# Patient Record
Sex: MALE | Race: WHITE | NOT HISPANIC OR LATINO | Employment: FULL TIME | URBAN - METROPOLITAN AREA
[De-identification: names, ages, dates, MRNs, and addresses within clinical notes are randomized per-mention and may not be internally consistent; named-entity substitution may affect disease eponyms.]

---
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
## 2019-12-21 ENCOUNTER — SURGERY - HEALTHEAST (OUTPATIENT)
Dept: GASTROENTEROLOGY | Facility: CLINIC | Age: 19
End: 2019-12-21

## 2019-12-21 ENCOUNTER — APPOINTMENT (OUTPATIENT)
Dept: CT IMAGING | Facility: CLINIC | Age: 19
End: 2019-12-21
Attending: EMERGENCY MEDICINE
Payer: COMMERCIAL

## 2019-12-21 ENCOUNTER — HOSPITAL ENCOUNTER (EMERGENCY)
Facility: CLINIC | Age: 19
Discharge: ANOTHER HOSPITAL | End: 2019-12-21
Attending: EMERGENCY MEDICINE | Admitting: EMERGENCY MEDICINE
Payer: COMMERCIAL

## 2019-12-21 VITALS
DIASTOLIC BLOOD PRESSURE: 67 MMHG | WEIGHT: 100 LBS | HEART RATE: 93 BPM | OXYGEN SATURATION: 100 % | BODY MASS INDEX: 16.9 KG/M2 | TEMPERATURE: 98.1 F | SYSTOLIC BLOOD PRESSURE: 114 MMHG | RESPIRATION RATE: 18 BRPM

## 2019-12-21 DIAGNOSIS — K56.2 VOLVULUS OF SIGMOID COLON (H): ICD-10-CM

## 2019-12-21 DIAGNOSIS — K56.609 COLONIC OBSTRUCTION (H): ICD-10-CM

## 2019-12-21 LAB
ALBUMIN SERPL-MCNC: 3.9 G/DL (ref 3.4–5)
ALBUMIN UR-MCNC: NEGATIVE MG/DL
ALP SERPL-CCNC: 88 U/L (ref 40–150)
ALT SERPL W P-5'-P-CCNC: 17 U/L (ref 0–70)
ANION GAP SERPL CALCULATED.3IONS-SCNC: 7 MMOL/L (ref 3–14)
APPEARANCE UR: CLEAR
AST SERPL W P-5'-P-CCNC: 12 U/L (ref 0–45)
BASOPHILS # BLD AUTO: 0 10E9/L (ref 0–0.2)
BASOPHILS NFR BLD AUTO: 0.3 %
BILIRUB SERPL-MCNC: 0.3 MG/DL (ref 0.2–1.3)
BILIRUB UR QL STRIP: NEGATIVE
BUN SERPL-MCNC: 17 MG/DL (ref 7–30)
CALCIUM SERPL-MCNC: 8.8 MG/DL (ref 8.5–10.1)
CHLORIDE SERPL-SCNC: 106 MMOL/L (ref 94–109)
CO2 SERPL-SCNC: 28 MMOL/L (ref 20–32)
COLOR UR AUTO: YELLOW
CREAT SERPL-MCNC: 0.93 MG/DL (ref 0.66–1.25)
DIFFERENTIAL METHOD BLD: ABNORMAL
EOSINOPHIL # BLD AUTO: 0.3 10E9/L (ref 0–0.7)
EOSINOPHIL NFR BLD AUTO: 2 %
ERYTHROCYTE [DISTWIDTH] IN BLOOD BY AUTOMATED COUNT: 12.7 % (ref 10–15)
GFR SERPL CREATININE-BSD FRML MDRD: >90 ML/MIN/{1.73_M2}
GLUCOSE SERPL-MCNC: 173 MG/DL (ref 70–99)
GLUCOSE UR STRIP-MCNC: NEGATIVE MG/DL
HCT VFR BLD AUTO: 42.2 % (ref 40–53)
HGB BLD-MCNC: 14.1 G/DL (ref 13.3–17.7)
HGB UR QL STRIP: NEGATIVE
IMM GRANULOCYTES # BLD: 0.1 10E9/L (ref 0–0.4)
IMM GRANULOCYTES NFR BLD: 0.5 %
KETONES UR STRIP-MCNC: 5 MG/DL
LACTATE BLD-SCNC: 2.9 MMOL/L (ref 0.7–2)
LEUKOCYTE ESTERASE UR QL STRIP: NEGATIVE
LYMPHOCYTES # BLD AUTO: 1.9 10E9/L (ref 0.8–5.3)
LYMPHOCYTES NFR BLD AUTO: 13.1 %
MCH RBC QN AUTO: 28.6 PG (ref 26.5–33)
MCHC RBC AUTO-ENTMCNC: 33.4 G/DL (ref 31.5–36.5)
MCV RBC AUTO: 86 FL (ref 78–100)
MONOCYTES # BLD AUTO: 0.6 10E9/L (ref 0–1.3)
MONOCYTES NFR BLD AUTO: 4.2 %
NEUTROPHILS # BLD AUTO: 11.9 10E9/L (ref 1.6–8.3)
NEUTROPHILS NFR BLD AUTO: 79.9 %
NITRATE UR QL: NEGATIVE
NRBC # BLD AUTO: 0 10*3/UL
NRBC BLD AUTO-RTO: 0 /100
PH UR STRIP: 5 PH (ref 5–7)
PLATELET # BLD AUTO: 282 10E9/L (ref 150–450)
POTASSIUM SERPL-SCNC: 3.5 MMOL/L (ref 3.4–5.3)
PROT SERPL-MCNC: 6.9 G/DL (ref 6.8–8.8)
RBC # BLD AUTO: 4.93 10E12/L (ref 4.4–5.9)
SODIUM SERPL-SCNC: 141 MMOL/L (ref 133–144)
SOURCE: ABNORMAL
SP GR UR STRIP: 1.03 (ref 1–1.03)
UROBILINOGEN UR STRIP-MCNC: 2 MG/DL (ref 0–2)
WBC # BLD AUTO: 14.8 10E9/L (ref 4–11)

## 2019-12-21 PROCEDURE — 25000128 H RX IP 250 OP 636: Performed by: EMERGENCY MEDICINE

## 2019-12-21 PROCEDURE — 83605 ASSAY OF LACTIC ACID: CPT | Performed by: EMERGENCY MEDICINE

## 2019-12-21 PROCEDURE — 96375 TX/PRO/DX INJ NEW DRUG ADDON: CPT | Performed by: EMERGENCY MEDICINE

## 2019-12-21 PROCEDURE — 85025 COMPLETE CBC W/AUTO DIFF WBC: CPT | Performed by: EMERGENCY MEDICINE

## 2019-12-21 PROCEDURE — 25800030 ZZH RX IP 258 OP 636: Performed by: EMERGENCY MEDICINE

## 2019-12-21 PROCEDURE — 96365 THER/PROPH/DIAG IV INF INIT: CPT | Mod: 59 | Performed by: EMERGENCY MEDICINE

## 2019-12-21 PROCEDURE — 25000125 ZZHC RX 250: Performed by: EMERGENCY MEDICINE

## 2019-12-21 PROCEDURE — 99285 EMERGENCY DEPT VISIT HI MDM: CPT | Mod: 25 | Performed by: EMERGENCY MEDICINE

## 2019-12-21 PROCEDURE — 74177 CT ABD & PELVIS W/CONTRAST: CPT

## 2019-12-21 PROCEDURE — 99285 EMERGENCY DEPT VISIT HI MDM: CPT | Mod: Z6 | Performed by: EMERGENCY MEDICINE

## 2019-12-21 PROCEDURE — 81003 URINALYSIS AUTO W/O SCOPE: CPT | Performed by: EMERGENCY MEDICINE

## 2019-12-21 PROCEDURE — 80053 COMPREHEN METABOLIC PANEL: CPT | Performed by: EMERGENCY MEDICINE

## 2019-12-21 PROCEDURE — 96361 HYDRATE IV INFUSION ADD-ON: CPT | Performed by: EMERGENCY MEDICINE

## 2019-12-21 RX ORDER — HYDROMORPHONE HYDROCHLORIDE 1 MG/ML
0.5 INJECTION, SOLUTION INTRAMUSCULAR; INTRAVENOUS; SUBCUTANEOUS
Status: DISCONTINUED | OUTPATIENT
Start: 2019-12-21 | End: 2019-12-21 | Stop reason: HOSPADM

## 2019-12-21 RX ORDER — ONDANSETRON 2 MG/ML
4 INJECTION INTRAMUSCULAR; INTRAVENOUS ONCE
Status: COMPLETED | OUTPATIENT
Start: 2019-12-21 | End: 2019-12-21

## 2019-12-21 RX ORDER — IOPAMIDOL 755 MG/ML
52 INJECTION, SOLUTION INTRAVASCULAR ONCE
Status: COMPLETED | OUTPATIENT
Start: 2019-12-21 | End: 2019-12-21

## 2019-12-21 RX ORDER — ONDANSETRON 2 MG/ML
4 INJECTION INTRAMUSCULAR; INTRAVENOUS EVERY 30 MIN PRN
Status: DISCONTINUED | OUTPATIENT
Start: 2019-12-21 | End: 2019-12-21 | Stop reason: HOSPADM

## 2019-12-21 RX ADMIN — SODIUM CHLORIDE 53 ML: 9 INJECTION, SOLUTION INTRAVENOUS at 05:33

## 2019-12-21 RX ADMIN — ONDANSETRON 4 MG: 2 INJECTION INTRAMUSCULAR; INTRAVENOUS at 04:48

## 2019-12-21 RX ADMIN — SODIUM CHLORIDE, POTASSIUM CHLORIDE, SODIUM LACTATE AND CALCIUM CHLORIDE 1000 ML: 600; 310; 30; 20 INJECTION, SOLUTION INTRAVENOUS at 06:57

## 2019-12-21 RX ADMIN — HYDROMORPHONE HYDROCHLORIDE 0.5 MG: 1 INJECTION, SOLUTION INTRAMUSCULAR; INTRAVENOUS; SUBCUTANEOUS at 05:27

## 2019-12-21 RX ADMIN — IOPAMIDOL 52 ML: 755 INJECTION, SOLUTION INTRAVENOUS at 05:33

## 2019-12-21 RX ADMIN — TAZOBACTAM SODIUM AND PIPERACILLIN SODIUM 4.5 G: 500; 4 INJECTION, SOLUTION INTRAVENOUS at 06:20

## 2019-12-21 ASSESSMENT — MIFFLIN-ST. JEOR
SCORE: 1376.51
SCORE: 1376.51

## 2019-12-21 NOTE — ED NOTES
DATE:  12/21/2019   TIME OF RECEIPT FROM LAB:  0555  LAB TEST:  Lactic   LAB VALUE:  2.9  RESULTS GIVEN WITH READ-BACK TO (PROVIDER):  Joe Rowe MD  TIME LAB VALUE REPORTED TO PROVIDER:   0557

## 2019-12-21 NOTE — ED PROVIDER NOTES
"  History     Chief Complaint   Patient presents with     Abdominal Pain     LLQ- \"painful bladder\"- hx UTI due to \"improper hygeine\"     HPI  Adonay Serrano is a 19 year old male with history fecal incontinence and urinary tract infections who presents with acute onset LLQ pain. Constant, \"just jurts\" non radiating, no provocative or palliating features.  Has not had similar pain before.  Not taking anything for pain.  Reports nausea and vomiting.  Denies fever, chills, cough, sore throat, headache, chest pain, shortness of breath.  No history abdominal surgeries.  Not currently taking any medications    The patient's PMHx, Surgical Hx, Allergies, and Medications were all reviewed with the patient.    Allergies:  No Known Allergies    Problem List:    Patient Active Problem List    Diagnosis Date Noted     Incontinence of feces with fecal urgency 11/02/2017     Priority: Medium        Past Medical History:    No past medical history on file.    Past Surgical History:    No past surgical history on file.    Family History:    Family History   Problem Relation Age of Onset     Diabetes Maternal Grandmother      Hypertension Maternal Grandmother      Genitourinary Problems Maternal Grandmother         polycystic kidney     Diabetes Maternal Grandfather      C.A.D. Maternal Grandfather      Cardiovascular Paternal Grandmother      Cardiovascular Paternal Grandfather        Social History:  Marital Status:  Single [1]  Social History     Tobacco Use     Smoking status: Never Smoker     Smokeless tobacco: Never Used   Substance Use Topics     Alcohol use: No     Drug use: No        Medications:    Acetaminophen (TYLENOL PO)  sulfamethoxazole-trimethoprim (BACTRIM DS/SEPTRA DS) 800-160 MG per tablet          Review of Systems   Constitutional: Negative for chills and fever.   HENT: Negative for congestion and trouble swallowing.    Eyes: Negative for visual disturbance.   Respiratory: Negative for cough, chest tightness and " shortness of breath.    Cardiovascular: Negative for chest pain and leg swelling.   Gastrointestinal: Positive for abdominal pain, nausea and vomiting. Negative for blood in stool and diarrhea.   Genitourinary: Negative for difficulty urinating and dysuria.   Musculoskeletal: Negative for back pain and neck pain.   Skin: Negative for rash.   Neurological: Negative for dizziness.       Physical Exam   BP: 126/78  Pulse: 103  Heart Rate: 106  Temp: 98.1  F (36.7  C)  Resp: 18  SpO2: 100 %    Physical Exam  GEN: Awake, alert, and cooperative.  Appears acutely distressed, lying on side with knees tucked to chest.  HENT: MMM. External ears and nose normal bilaterally.  EYES: EOM intact. Conjunctiva clear. No discharge.   NECK: Supple, symmetric.  CV : Tachycardic rate, regular rhythm..  Extremities warm and well perfused.  PULM: Normal effort. No wheezes, rales, or rhonchi bilaterally.  ABD: Soft, nondistended, left lower quadrant tenderness to palpation.  No peritoneal signs  NEURO: Normal speech. Following commands. CN II-XII grossly intact. Answering questions and interacting appropriately.   EXT: No gross deformity. Warm and well perfused  INT: Warm. No diaphoresis. Normal color.        ED Course        Procedures           Critical Care time:  none               Results for orders placed or performed during the hospital encounter of 12/21/19 (from the past 24 hour(s))   CBC with platelets differential   Result Value Ref Range    WBC 14.8 (H) 4.0 - 11.0 10e9/L    RBC Count 4.93 4.4 - 5.9 10e12/L    Hemoglobin 14.1 13.3 - 17.7 g/dL    Hematocrit 42.2 40.0 - 53.0 %    MCV 86 78 - 100 fl    MCH 28.6 26.5 - 33.0 pg    MCHC 33.4 31.5 - 36.5 g/dL    RDW 12.7 10.0 - 15.0 %    Platelet Count 282 150 - 450 10e9/L    Diff Method Automated Method     % Neutrophils 79.9 %    % Lymphocytes 13.1 %    % Monocytes 4.2 %    % Eosinophils 2.0 %    % Basophils 0.3 %    % Immature Granulocytes 0.5 %    Nucleated RBCs 0 0 /100     Absolute Neutrophil 11.9 (H) 1.6 - 8.3 10e9/L    Absolute Lymphocytes 1.9 0.8 - 5.3 10e9/L    Absolute Monocytes 0.6 0.0 - 1.3 10e9/L    Absolute Eosinophils 0.3 0.0 - 0.7 10e9/L    Absolute Basophils 0.0 0.0 - 0.2 10e9/L    Abs Immature Granulocytes 0.1 0 - 0.4 10e9/L    Absolute Nucleated RBC 0.0    Comprehensive metabolic panel   Result Value Ref Range    Sodium 141 133 - 144 mmol/L    Potassium 3.5 3.4 - 5.3 mmol/L    Chloride 106 94 - 109 mmol/L    Carbon Dioxide 28 20 - 32 mmol/L    Anion Gap 7 3 - 14 mmol/L    Glucose 173 (H) 70 - 99 mg/dL    Urea Nitrogen 17 7 - 30 mg/dL    Creatinine 0.93 0.66 - 1.25 mg/dL    GFR Estimate >90 >60 mL/min/[1.73_m2]    GFR Estimate If Black >90 >60 mL/min/[1.73_m2]    Calcium 8.8 8.5 - 10.1 mg/dL    Bilirubin Total 0.3 0.2 - 1.3 mg/dL    Albumin 3.9 3.4 - 5.0 g/dL    Protein Total 6.9 6.8 - 8.8 g/dL    Alkaline Phosphatase 88 40 - 150 U/L    ALT 17 0 - 70 U/L    AST 12 0 - 45 U/L   UA reflex to Microscopic and Culture   Result Value Ref Range    Color Urine Yellow     Appearance Urine Clear     Glucose Urine Negative NEG^Negative mg/dL    Bilirubin Urine Negative NEG^Negative    Ketones Urine 5 (A) NEG^Negative mg/dL    Specific Gravity Urine 1.029 1.003 - 1.035    Blood Urine Negative NEG^Negative    pH Urine 5.0 5.0 - 7.0 pH    Protein Albumin Urine Negative NEG^Negative mg/dL    Urobilinogen mg/dL 2.0 0.0 - 2.0 mg/dL    Nitrite Urine Negative NEG^Negative    Leukocyte Esterase Urine Negative NEG^Negative    Source Midstream Urine        Medications   ondansetron (ZOFRAN) injection 4 mg (4 mg Intravenous Given 12/21/19 7388)     CT Abdomen Pelvis w Contrast   Final Result   IMPRESSION:    1.  Very large amount of stool throughout the colon, especially in the rectum.      2.  Sigmoid colon is redundant and there is abnormal twisting of the mesentery in the left lower quadrant along with associated relative significant focal narrowing in the sigmoid colon at the level of the  twisting. Findings have an appearance most    suggestive of sigmoid volvulus. No obvious bowel wall thickening, bowel wall pneumatosis, or free air. Surgical consultation is recommended.      3.  No significant findings elsewhere.      Findings called to Dr. Rowe 12/21/2019 6:05 AM.               Assessments & Plan (with Medical Decision Making)   19 year old male with no significant past medical history who presents with acute onset left lower quadrant abdominal pain.  On arrival to Emergency Department, vital signs were notable for mild tachycardia with heart rate of 106.  On exam patient appeared acutely distressed but nontoxic.  Exam with definite left lower quadrant tenderness to palpation.  No history of abdominal surgeries.  No constipation per history.  Labs obtained and leukocytosis of 14.8.  CMP grossly normal with exception of elevated glucose of 173.  Urinalysis without evidence of acute infection.  Given abdominal exam and leukocytosis empirically cover with 4.5 g of Zosyn.  Also given for milligrams IV ondansetron for nausea and 1 L bolus of lactated Ringer solution.  CT scan of abdomen and pelvis with contrast obtained.  Images reviewed by writer as well as read from radiologist and notable for large stool burden and concern for sigmoid volvulus.  No pneumatosis or free air identified.  I spoke with Dr. Jones from general surgery who agreed that he needs surgical evaluation possible flexible sigmoidoscopy.  Due to lack of bed availability at [facility] we will plan to transfer patient to [facility].  He recommended obtaining lactate.  Lactate drawn and found to be elevated to 2.9.  Care patient signed out to hospitalist at [facility] who except the transfer.  Patient transferred in stable condition.    I have reviewed the nursing notes.         Discharge Medication List as of 12/21/2019  8:45 AM          Final diagnoses:   Volvulus of sigmoid colon (H)   Colonic obstruction (H)     Joe BROOKS  Soledad GANT    12/21/2019   [facility] EMERGENCY DEPARTMENT    Disclaimer: This note consists of words and symbols derived from keyboarding and dictation using voice recognition software.  As a result, there may be errors that have gone undetected.  Please consider this when interpreting information found in this note.    Joe Rowe MD  12/27/19 1947

## 2019-12-26 ASSESSMENT — ENCOUNTER SYMPTOMS
DIFFICULTY URINATING: 0
SHORTNESS OF BREATH: 0
CHEST TIGHTNESS: 0
CHILLS: 0
DIARRHEA: 0
BACK PAIN: 0
BLOOD IN STOOL: 0
NECK PAIN: 0
DIZZINESS: 0
COUGH: 0
VOMITING: 1
NAUSEA: 1
FEVER: 0
ABDOMINAL PAIN: 1
DYSURIA: 0
TROUBLE SWALLOWING: 0

## 2021-06-04 VITALS — HEIGHT: 65 IN | BODY MASS INDEX: 16.51 KG/M2 | WEIGHT: 99.13 LBS

## 2022-08-25 ENCOUNTER — HOSPITAL ENCOUNTER (EMERGENCY)
Facility: CLINIC | Age: 22
Discharge: HOME | End: 2022-08-25
Attending: PHYSICIAN ASSISTANT | Admitting: PHYSICIAN ASSISTANT
Payer: COMMERCIAL

## 2022-08-25 ENCOUNTER — APPOINTMENT (OUTPATIENT)
Dept: GENERAL RADIOLOGY | Facility: CLINIC | Age: 22
End: 2022-08-25
Attending: EMERGENCY MEDICINE
Payer: COMMERCIAL

## 2022-08-25 VITALS
DIASTOLIC BLOOD PRESSURE: 68 MMHG | BODY MASS INDEX: 16.66 KG/M2 | HEART RATE: 80 BPM | RESPIRATION RATE: 18 BRPM | WEIGHT: 100 LBS | HEIGHT: 65 IN | TEMPERATURE: 97 F | SYSTOLIC BLOOD PRESSURE: 118 MMHG | OXYGEN SATURATION: 99 %

## 2022-08-25 DIAGNOSIS — S92.502A CLOSED FRACTURE OF PHALANX OF LEFT FIFTH TOE, INITIAL ENCOUNTER: ICD-10-CM

## 2022-08-25 DIAGNOSIS — Z23 NEED FOR TDAP VACCINATION: ICD-10-CM

## 2022-08-25 PROCEDURE — 99284 EMERGENCY DEPT VISIT MOD MDM: CPT | Mod: 25 | Performed by: PHYSICIAN ASSISTANT

## 2022-08-25 PROCEDURE — 73630 X-RAY EXAM OF FOOT: CPT | Mod: LT

## 2022-08-25 PROCEDURE — 28510 TREATMENT OF TOE FRACTURE: CPT | Mod: LT | Performed by: PHYSICIAN ASSISTANT

## 2022-08-25 PROCEDURE — 28510 TREATMENT OF TOE FRACTURE: CPT | Mod: 54 | Performed by: PHYSICIAN ASSISTANT

## 2022-08-25 PROCEDURE — 90471 IMMUNIZATION ADMIN: CPT | Performed by: PHYSICIAN ASSISTANT

## 2022-08-25 PROCEDURE — 250N000011 HC RX IP 250 OP 636: Performed by: PHYSICIAN ASSISTANT

## 2022-08-25 PROCEDURE — 90715 TDAP VACCINE 7 YRS/> IM: CPT | Performed by: PHYSICIAN ASSISTANT

## 2022-08-25 RX ADMIN — CLOSTRIDIUM TETANI TOXOID ANTIGEN (FORMALDEHYDE INACTIVATED), CORYNEBACTERIUM DIPHTHERIAE TOXOID ANTIGEN (FORMALDEHYDE INACTIVATED), BORDETELLA PERTUSSIS TOXOID ANTIGEN (GLUTARALDEHYDE INACTIVATED), BORDETELLA PERTUSSIS FILAMENTOUS HEMAGGLUTININ ANTIGEN (FORMALDEHYDE INACTIVATED), BORDETELLA PERTUSSIS PERTACTIN ANTIGEN, AND BORDETELLA PERTUSSIS FIMBRIAE 2/3 ANTIGEN 0.5 ML: 5; 2; 2.5; 5; 3; 5 INJECTION, SUSPENSION INTRAMUSCULAR at 12:43

## 2022-08-25 NOTE — DISCHARGE INSTRUCTIONS
Ice, rest, elevate, Tylenol and ibuprofen over-the-counter as needed for pain.    Tdap updated today    Buddy tape fourth and fifth toes and postop shoe to use for the next 2 weeks until you are rechecked by podiatry or primary care doctor.     Return the emergency department if signs or symptoms of infection occur this could redness, hot, purulent drainage, red streaking, fevers or chills

## 2022-08-25 NOTE — ED TRIAGE NOTES
Left pinky toe injury      Triage Assessment     Row Name 08/25/22 1040       Triage Assessment (Adult)    Airway WDL WDL       Respiratory WDL    Respiratory WDL WDL       Skin Circulation/Temperature WDL    Skin Circulation/Temperature WDL WDL       Cardiac WDL    Cardiac WDL WDL

## 2022-08-25 NOTE — ED PROVIDER NOTES
History     Chief Complaint   Patient presents with     Toe Injury     Left pinky toe injury yesterday      HPI  Adonay Serrano is a 21 year old male who presents today with injury to left 5th toe that occurred yesterday. Patient states he was walking and stubbed his toe on the door frame when injury occurred. Patient states no significant pain, but bruising, and swelling noted to the toe and dorsum of the left foot. Patient denies any numbness, skin abrasion or lacerations, toenail/nailbed injury, or ankle pain.   Patient's last Tdap was in 2010. He states he does not have a primary care provider and would like Tdap updated today.     Allergies:  No Known Allergies    Problem List:    Patient Active Problem List    Diagnosis Date Noted     Incontinence of feces with fecal urgency 11/02/2017     Priority: Medium        Past Medical History:    No past medical history on file.    Past Surgical History:    Past Surgical History:   Procedure Laterality Date     SIGMOIDOSCOPY FLEXIBLE N/A 12/21/2019    Procedure: SIGMOIDOSCOPY;  Surgeon: Krishna Estrada MD;  Location: [facility];  Service: Gastroenterology       Family History:    Family History   Problem Relation Age of Onset     Diabetes Maternal Grandmother      Hypertension Maternal Grandmother      Genitourinary Problems Maternal Grandmother         polycystic kidney     Diabetes Maternal Grandfather      C.A.D. Maternal Grandfather      Cardiovascular Paternal Grandmother      Cardiovascular Paternal Grandfather        Social History:  Marital Status:  Single [1]  Social History     Tobacco Use     Smoking status: Never Smoker     Smokeless tobacco: Never Used   Substance Use Topics     Alcohol use: Never     Drug use: Never        Medications:    Acetaminophen (TYLENOL PO)  sulfamethoxazole-trimethoprim (BACTRIM DS/SEPTRA DS) 800-160 MG per tablet          Review of Systems   Musculoskeletal:        Left 5th toe injury yesterday       Physical Exam  "  BP: 118/68  Pulse: 80  Temp: 97  F (36.1  C)  Resp: 18  Height: 165.1 cm (5' 5\")  Weight: 45.4 kg (100 lb)  SpO2: 99 %      Physical Exam  Vitals and nursing note reviewed.   Constitutional:       General: He is not in acute distress.     Appearance: Normal appearance. He is normal weight. He is not ill-appearing or toxic-appearing.   HENT:      Head: Normocephalic and atraumatic.   Eyes:      General: No scleral icterus.     Extraocular Movements: Extraocular movements intact.      Pupils: Pupils are equal, round, and reactive to light.   Cardiovascular:      Pulses: Normal pulses.   Musculoskeletal:         General: Swelling (to the 5th toe of left foot) and tenderness (left 5th toe. ) present. No deformity or signs of injury.      Right lower leg: No edema.      Left lower leg: No edema.      Comments: Patient neurovascularly intact with full range of motion in the toes and foot and ankle of the left lower extremity.  No skin abrasions or lacerations noted.  No nailbed injury or dislocation noted.   Skin:     General: Skin is warm.      Capillary Refill: Capillary refill takes less than 2 seconds.      Findings: Bruising (to the 5th toe and dorsum of the lateral left foot. ) present. No erythema or rash.   Neurological:      General: No focal deficit present.      Mental Status: He is alert and oriented to person, place, and time.      Sensory: No sensory deficit.      Motor: No weakness.      Gait: Gait normal.   Psychiatric:         Mood and Affect: Mood normal.         Behavior: Behavior normal.         Thought Content: Thought content normal.         Judgment: Judgment normal.         ED Course                 Procedures             Critical Care time:  none               Results for orders placed or performed during the hospital encounter of 08/25/22 (from the past 24 hour(s))   Foot XR, G/E 3 views, left    Narrative    XR FOOT LEFT G/E 3 VIEWS 8/25/2022 11:23 AM     HISTORY: foot injury/trauma. Pain in " 5th digit    COMPARISON: None.      Impression    IMPRESSION: Subtle oblique lucency in the fifth toe proximal phalanx  consistent with a nondisplaced fracture. Normal joint spacing.     ELAINE SINGH MD         SYSTEM ID:  [id number]       Medications   Tdap (tetanus-diphtheria-acell pertussis) (ADACEL) injection 0.5 mL (0.5 mLs Intramuscular Given 8/25/22 1249)       Assessments & Plan (with Medical Decision Making)     I have reviewed the nursing notes.    I have reviewed the findings, diagnosis, plan and need for follow up with the patient.    Adonay Serrano is a 21 year old male who presents today with injury to left 5th toe that occurred yesterday. Patient states he was walking and stubbed his toe on the door frame when injury occurred. Patient states no significant pain, but bruising, and swelling noted to the toe and dorsum of the left foot. Patient denies any numbness, skin abrasion or lacerations, toenail/nailbed injury, or ankle pain.   Patient's last Tdap was in 2010. He states he does not have a primary care provider and would like Tdap updated today. See exam findings above. Patient informed to ice, elevate, tylenol and ibuprofen over the counter as needed for pain. Patient's 4th and 5th toes buddy taped and post op shoe placed. Patient to follow up with primary care provider or podiatry for recheck in 2 weeks. Podiatry referral placed.  Patient in agreement with plan and discharged in stable condition.  Tdap updated today.  X-rays were discussed and reviewed with patient today    New Prescriptions    No medications on file       Final diagnoses:   Closed fracture of phalanx of left fifth toe, initial encounter   Need for Tdap vaccination       8/25/2022   [facility] EMERGENCY DEPT     Alexa Mendez PA-C  08/25/22 4179

## 2025-06-17 NOTE — TELEPHONE ENCOUNTER
I cancelled the order and placed a colorectal referral, will defer to them to order appropriate test. Thanks, BALDOMERO Mahajan CNP     Patient returning call